# Patient Record
Sex: FEMALE | Race: WHITE | NOT HISPANIC OR LATINO | Employment: UNEMPLOYED | URBAN - NONMETROPOLITAN AREA
[De-identification: names, ages, dates, MRNs, and addresses within clinical notes are randomized per-mention and may not be internally consistent; named-entity substitution may affect disease eponyms.]

---

## 2024-10-17 ENCOUNTER — HOSPITAL ENCOUNTER (EMERGENCY)
Facility: HOSPITAL | Age: 54
Discharge: PSYCHIATRIC OR SUBSTANCE USE FACILITY | End: 2024-10-17
Attending: EMERGENCY MEDICINE
Payer: COMMERCIAL

## 2024-10-17 ENCOUNTER — HOSPITAL ENCOUNTER (OUTPATIENT)
Facility: HOSPITAL | Age: 54
Setting detail: OBSERVATION
Discharge: HOME | End: 2024-10-19
Attending: STUDENT IN AN ORGANIZED HEALTH CARE EDUCATION/TRAINING PROGRAM | Admitting: STUDENT IN AN ORGANIZED HEALTH CARE EDUCATION/TRAINING PROGRAM
Payer: COMMERCIAL

## 2024-10-17 VITALS
BODY MASS INDEX: 20.49 KG/M2 | HEIGHT: 64 IN | RESPIRATION RATE: 16 BRPM | OXYGEN SATURATION: 100 % | TEMPERATURE: 98.1 F | DIASTOLIC BLOOD PRESSURE: 80 MMHG | SYSTOLIC BLOOD PRESSURE: 128 MMHG | WEIGHT: 120 LBS | HEART RATE: 82 BPM

## 2024-10-17 DIAGNOSIS — F10.10 ALCOHOL ABUSE: Primary | ICD-10-CM

## 2024-10-17 PROBLEM — F19.20 CHEMICAL DEPENDENCY: Status: ACTIVE | Noted: 2024-10-17

## 2024-10-17 LAB
ALBUMIN SERPL-MCNC: 3.6 G/DL (ref 3.5–5.2)
ALBUMIN/GLOB SERPL: 1.4 G/DL
ALP SERPL-CCNC: 87 U/L (ref 39–117)
ALT SERPL W P-5'-P-CCNC: 11 U/L (ref 1–33)
AMPHET+METHAMPHET UR QL: NEGATIVE
AMPHETAMINES UR QL: POSITIVE
ANION GAP SERPL CALCULATED.3IONS-SCNC: 8.4 MMOL/L (ref 5–15)
AST SERPL-CCNC: 16 U/L (ref 1–32)
BARBITURATES UR QL SCN: NEGATIVE
BASOPHILS # BLD AUTO: 0.04 10*3/MM3 (ref 0–0.2)
BASOPHILS NFR BLD AUTO: 0.6 % (ref 0–1.5)
BENZODIAZ UR QL SCN: NEGATIVE
BILIRUB SERPL-MCNC: <0.2 MG/DL (ref 0–1.2)
BILIRUB UR QL STRIP: NEGATIVE
BUN SERPL-MCNC: 10 MG/DL (ref 6–20)
BUN/CREAT SERPL: 14.3 (ref 7–25)
BUPRENORPHINE SERPL-MCNC: NEGATIVE NG/ML
CALCIUM SPEC-SCNC: 9 MG/DL (ref 8.6–10.5)
CANNABINOIDS SERPL QL: NEGATIVE
CHLORIDE SERPL-SCNC: 105 MMOL/L (ref 98–107)
CLARITY UR: ABNORMAL
CO2 SERPL-SCNC: 28.6 MMOL/L (ref 22–29)
COCAINE UR QL: NEGATIVE
COLOR UR: YELLOW
CREAT SERPL-MCNC: 0.7 MG/DL (ref 0.57–1)
DEPRECATED RDW RBC AUTO: 47.4 FL (ref 37–54)
EGFRCR SERPLBLD CKD-EPI 2021: 103.6 ML/MIN/1.73
EOSINOPHIL # BLD AUTO: 0.16 10*3/MM3 (ref 0–0.4)
EOSINOPHIL NFR BLD AUTO: 2.3 % (ref 0.3–6.2)
ERYTHROCYTE [DISTWIDTH] IN BLOOD BY AUTOMATED COUNT: 13.2 % (ref 12.3–15.4)
ETHANOL BLD-MCNC: <10 MG/DL (ref 0–10)
ETHANOL UR QL: <0.01 %
FENTANYL UR-MCNC: NEGATIVE NG/ML
GLOBULIN UR ELPH-MCNC: 2.6 GM/DL
GLUCOSE SERPL-MCNC: 89 MG/DL (ref 65–99)
GLUCOSE UR STRIP-MCNC: NEGATIVE MG/DL
HCG SERPL QL: NEGATIVE
HCT VFR BLD AUTO: 43.1 % (ref 34–46.6)
HGB BLD-MCNC: 14.2 G/DL (ref 12–15.9)
HGB UR QL STRIP.AUTO: NEGATIVE
IMM GRANULOCYTES # BLD AUTO: 0.02 10*3/MM3 (ref 0–0.05)
IMM GRANULOCYTES NFR BLD AUTO: 0.3 % (ref 0–0.5)
KETONES UR QL STRIP: NEGATIVE
LEUKOCYTE ESTERASE UR QL STRIP.AUTO: NEGATIVE
LYMPHOCYTES # BLD AUTO: 2.22 10*3/MM3 (ref 0.7–3.1)
LYMPHOCYTES NFR BLD AUTO: 32.5 % (ref 19.6–45.3)
MAGNESIUM SERPL-MCNC: 2 MG/DL (ref 1.6–2.6)
MCH RBC QN AUTO: 31.7 PG (ref 26.6–33)
MCHC RBC AUTO-ENTMCNC: 32.9 G/DL (ref 31.5–35.7)
MCV RBC AUTO: 96.2 FL (ref 79–97)
METHADONE UR QL SCN: NEGATIVE
MONOCYTES # BLD AUTO: 0.62 10*3/MM3 (ref 0.1–0.9)
MONOCYTES NFR BLD AUTO: 9.1 % (ref 5–12)
NEUTROPHILS NFR BLD AUTO: 3.78 10*3/MM3 (ref 1.7–7)
NEUTROPHILS NFR BLD AUTO: 55.2 % (ref 42.7–76)
NITRITE UR QL STRIP: NEGATIVE
NRBC BLD AUTO-RTO: 0 /100 WBC (ref 0–0.2)
OPIATES UR QL: NEGATIVE
OXYCODONE UR QL SCN: NEGATIVE
PCP UR QL SCN: NEGATIVE
PH UR STRIP.AUTO: 6.5 [PH] (ref 5–8)
PLATELET # BLD AUTO: 161 10*3/MM3 (ref 140–450)
PMV BLD AUTO: 9.8 FL (ref 6–12)
POTASSIUM SERPL-SCNC: 4 MMOL/L (ref 3.5–5.2)
PROT SERPL-MCNC: 6.2 G/DL (ref 6–8.5)
PROT UR QL STRIP: NEGATIVE
RBC # BLD AUTO: 4.48 10*6/MM3 (ref 3.77–5.28)
SODIUM SERPL-SCNC: 142 MMOL/L (ref 136–145)
SP GR UR STRIP: 1.02 (ref 1–1.03)
TRICYCLICS UR QL SCN: NEGATIVE
UROBILINOGEN UR QL STRIP: ABNORMAL
WBC NRBC COR # BLD AUTO: 6.84 10*3/MM3 (ref 3.4–10.8)

## 2024-10-17 PROCEDURE — 82077 ASSAY SPEC XCP UR&BREATH IA: CPT

## 2024-10-17 PROCEDURE — G0378 HOSPITAL OBSERVATION PER HR: HCPCS

## 2024-10-17 PROCEDURE — 99285 EMERGENCY DEPT VISIT HI MDM: CPT

## 2024-10-17 PROCEDURE — 80053 COMPREHEN METABOLIC PANEL: CPT

## 2024-10-17 PROCEDURE — 80307 DRUG TEST PRSMV CHEM ANLYZR: CPT

## 2024-10-17 PROCEDURE — 36415 COLL VENOUS BLD VENIPUNCTURE: CPT

## 2024-10-17 PROCEDURE — 84703 CHORIONIC GONADOTROPIN ASSAY: CPT

## 2024-10-17 PROCEDURE — 93005 ELECTROCARDIOGRAM TRACING: CPT | Performed by: STUDENT IN AN ORGANIZED HEALTH CARE EDUCATION/TRAINING PROGRAM

## 2024-10-17 PROCEDURE — 83735 ASSAY OF MAGNESIUM: CPT

## 2024-10-17 PROCEDURE — 85025 COMPLETE CBC W/AUTO DIFF WBC: CPT

## 2024-10-17 PROCEDURE — 81003 URINALYSIS AUTO W/O SCOPE: CPT

## 2024-10-17 RX ORDER — BENZTROPINE MESYLATE 1 MG/ML
1 INJECTION, SOLUTION INTRAMUSCULAR; INTRAVENOUS ONCE AS NEEDED
Status: DISCONTINUED | OUTPATIENT
Start: 2024-10-17 | End: 2024-10-19 | Stop reason: HOSPADM

## 2024-10-17 RX ORDER — BENZTROPINE MESYLATE 1 MG/1
2 TABLET ORAL ONCE AS NEEDED
Status: DISCONTINUED | OUTPATIENT
Start: 2024-10-17 | End: 2024-10-19 | Stop reason: HOSPADM

## 2024-10-17 RX ORDER — POLYETHYLENE GLYCOL 3350 17 G/17G
17 POWDER, FOR SOLUTION ORAL DAILY PRN
Status: DISCONTINUED | OUTPATIENT
Start: 2024-10-17 | End: 2024-10-19 | Stop reason: HOSPADM

## 2024-10-17 RX ORDER — SERTRALINE HYDROCHLORIDE 100 MG/1
100 TABLET, FILM COATED ORAL DAILY
Status: ON HOLD | COMMUNITY
End: 2024-10-19

## 2024-10-17 RX ORDER — BENZONATATE 100 MG/1
100 CAPSULE ORAL 3 TIMES DAILY PRN
Status: DISCONTINUED | OUTPATIENT
Start: 2024-10-17 | End: 2024-10-19 | Stop reason: HOSPADM

## 2024-10-17 RX ORDER — FAMOTIDINE 20 MG/1
20 TABLET, FILM COATED ORAL 2 TIMES DAILY PRN
Status: DISCONTINUED | OUTPATIENT
Start: 2024-10-17 | End: 2024-10-19 | Stop reason: HOSPADM

## 2024-10-17 RX ORDER — HYDROXYZINE HYDROCHLORIDE 50 MG/1
50 TABLET, FILM COATED ORAL EVERY 6 HOURS PRN
Status: DISCONTINUED | OUTPATIENT
Start: 2024-10-17 | End: 2024-10-19 | Stop reason: HOSPADM

## 2024-10-17 RX ORDER — TRAZODONE HYDROCHLORIDE 50 MG/1
50 TABLET ORAL NIGHTLY PRN
Status: DISCONTINUED | OUTPATIENT
Start: 2024-10-17 | End: 2024-10-18

## 2024-10-17 RX ORDER — ONDANSETRON 4 MG/1
4 TABLET, ORALLY DISINTEGRATING ORAL EVERY 6 HOURS PRN
Status: DISCONTINUED | OUTPATIENT
Start: 2024-10-17 | End: 2024-10-19 | Stop reason: HOSPADM

## 2024-10-17 RX ORDER — MULTIPLE VITAMINS W/ MINERALS TAB 9MG-400MCG
1 TAB ORAL DAILY
Status: DISCONTINUED | OUTPATIENT
Start: 2024-10-18 | End: 2024-10-19 | Stop reason: HOSPADM

## 2024-10-17 RX ORDER — ALUMINA, MAGNESIA, AND SIMETHICONE 2400; 2400; 240 MG/30ML; MG/30ML; MG/30ML
15 SUSPENSION ORAL EVERY 6 HOURS PRN
Status: DISCONTINUED | OUTPATIENT
Start: 2024-10-17 | End: 2024-10-19 | Stop reason: HOSPADM

## 2024-10-17 RX ORDER — LOPERAMIDE HYDROCHLORIDE 2 MG/1
2 CAPSULE ORAL
Status: DISCONTINUED | OUTPATIENT
Start: 2024-10-17 | End: 2024-10-19 | Stop reason: HOSPADM

## 2024-10-17 RX ORDER — MIRTAZAPINE 15 MG/1
15 TABLET, FILM COATED ORAL NIGHTLY
Status: ON HOLD | COMMUNITY
End: 2024-10-19

## 2024-10-17 RX ORDER — ECHINACEA PURPUREA EXTRACT 125 MG
2 TABLET ORAL AS NEEDED
Status: DISCONTINUED | OUTPATIENT
Start: 2024-10-17 | End: 2024-10-19 | Stop reason: HOSPADM

## 2024-10-17 RX ORDER — IBUPROFEN 400 MG/1
400 TABLET, FILM COATED ORAL EVERY 6 HOURS PRN
Status: DISCONTINUED | OUTPATIENT
Start: 2024-10-17 | End: 2024-10-19 | Stop reason: HOSPADM

## 2024-10-17 RX ORDER — ACETAMINOPHEN 325 MG/1
650 TABLET ORAL EVERY 6 HOURS PRN
Status: DISCONTINUED | OUTPATIENT
Start: 2024-10-17 | End: 2024-10-19 | Stop reason: HOSPADM

## 2024-10-17 RX ORDER — MULTIVITAMIN WITH IRON
2 TABLET ORAL DAILY
Status: DISCONTINUED | OUTPATIENT
Start: 2024-10-18 | End: 2024-10-19 | Stop reason: HOSPADM

## 2024-10-18 PROBLEM — F17.200 NICOTINE USE DISORDER: Status: ACTIVE | Noted: 2024-10-18

## 2024-10-18 PROBLEM — F15.20 METHAMPHETAMINE USE DISORDER, SEVERE: Status: ACTIVE | Noted: 2024-10-18

## 2024-10-18 PROBLEM — F10.20 ALCOHOL USE DISORDER, SEVERE, DEPENDENCE: Status: ACTIVE | Noted: 2024-10-17

## 2024-10-18 PROBLEM — F33.8 OTHER RECURRENT DEPRESSIVE DISORDERS: Status: ACTIVE | Noted: 2024-10-18

## 2024-10-18 LAB
QT INTERVAL: 470 MS
QTC INTERVAL: 437 MS

## 2024-10-18 PROCEDURE — G0378 HOSPITAL OBSERVATION PER HR: HCPCS

## 2024-10-18 PROCEDURE — 93010 ELECTROCARDIOGRAM REPORT: CPT | Performed by: SPECIALIST

## 2024-10-18 PROCEDURE — 99222 1ST HOSP IP/OBS MODERATE 55: CPT | Performed by: PSYCHIATRY & NEUROLOGY

## 2024-10-18 RX ORDER — SERTRALINE HYDROCHLORIDE 25 MG/1
50 TABLET, FILM COATED ORAL DAILY
Status: DISCONTINUED | OUTPATIENT
Start: 2024-10-18 | End: 2024-10-19 | Stop reason: HOSPADM

## 2024-10-18 RX ORDER — MIRTAZAPINE 15 MG/1
15 TABLET, FILM COATED ORAL NIGHTLY
Status: DISCONTINUED | OUTPATIENT
Start: 2024-10-18 | End: 2024-10-19 | Stop reason: HOSPADM

## 2024-10-18 RX ADMIN — SERTRALINE HYDROCHLORIDE 50 MG: 25 TABLET ORAL at 10:54

## 2024-10-18 RX ADMIN — HYDROXYZINE HYDROCHLORIDE 50 MG: 50 TABLET, FILM COATED ORAL at 21:01

## 2024-10-18 RX ADMIN — MIRTAZAPINE 15 MG: 15 TABLET, FILM COATED ORAL at 21:01

## 2024-10-18 RX ADMIN — Medication 100 MG: at 08:49

## 2024-10-18 RX ADMIN — Medication 2 TABLET: at 08:49

## 2024-10-18 RX ADMIN — HYDROXYZINE HYDROCHLORIDE 50 MG: 50 TABLET, FILM COATED ORAL at 09:11

## 2024-10-18 RX ADMIN — Medication 1 TABLET: at 08:49

## 2024-10-18 NOTE — PLAN OF CARE
Goal Outcome Evaluation:        Consent Given to Review Plan with: Patient declines family involvement.     Outcome Evaluation: Therapist met with patient to review care plan, social history, aftercare recommendation, and disposition plan; patient agreeable.      Problem: Adult Behavioral Health Plan of Care  Goal: Plan of Care Review  Outcome: Progressing  Flowsheets  Taken 10/18/2024 1435 by Eulogio Ochoa  Consent Given to Review Plan with: Patient declines family involvement.  Outcome Evaluation:   Therapist met with patient to review care plan, social history, aftercare recommendation, and disposition plan   patient agreeable.  Taken 10/18/2024 0830 by Radha Patel, DANIEL  Progress: improving  Patient Agreement with Plan of Care: agrees  Plan of Care Reviewed With: patient     Problem: Adult Behavioral Health Plan of Care  Goal: Patient-Specific Goal (Individualization)  Outcome: Progressing  Flowsheets  Taken 10/18/2024 1435 by Eulogio Ochoa  Patient/Family-Specific Goals (Include Timeframe): Identify 2-3 coping skills, address relapse prevention methods, complete aftercare plan, complete safety plan, and deny SI/Hi within 1-7 days.  Individualized Care Needs: Therapist to offer 1-4 thrapy sessions, aftercare planning, safety planning, daily groups, and brief CBT/MI interventions.  Taken 10/18/2024 1421 by Eulogio Ochoa  Patient Personal Strengths:   self-reliant   resilient   resourceful  Patient Vulnerabilities:   substance abuse/addiction   housing insecurity  Taken 10/17/2024 2308 by Mildred Guzman, RN  Anxieties, Fears or Concerns: none voiced     Problem: Adult Behavioral Health Plan of Care  Goal: Optimized Coping Skills in Response to Life Stressors  Outcome: Progressing  Intervention: Promote Effective Coping Strategies  Flowsheets (Taken 10/18/2024 1435)  Supportive Measures:   active listening utilized   counseling provided   decision-making supported   positive reinforcement provided    problem-solving facilitated   self-responsibility promoted   verbalization of feelings encouraged   self-reflection promoted     Problem: Adult Behavioral Health Plan of Care  Goal: Develops/Participates in Therapeutic [place] to Support Successful Transition  Outcome: Progressing  Intervention: Foster Therapeutic [place]  Flowsheets (Taken 10/18/2024 1435)  Trust Relationship/Rapport:   care explained   choices provided   emotional support provided   reassurance provided   questions encouraged   empathic listening provided   thoughts/feelings acknowledged   questions answered  Intervention: Mutually Develop Transition Plan  Flowsheets  Taken 10/18/2024 1435 by Eulogio Ochoa  Discharge Coordination/Progress:   Therapist met with patient to complete discharge needs assessment   patient agreeable to residential substance use treatment.  Transition Support:   crisis management plan promoted   crisis management plan verbalized   follow-up care discussed   follow-up care coordinated  Anticipated Discharge Disposition: residential substance use unit  Current Discharge Risk:   substance use/abuse   homeless  Concerns to be Addressed:   substance/tobacco abuse/use   homelessness  Readmission Within the Last 30 Days: no previous admission in last 30 days  Taken 10/17/2024 2316 by Mildred Guzman, RN  Transportation Anticipated:   agency   family or friend will provide   public transportation  Transportation Concerns: no car  Patient/Family Anticipated Services at Transition:      mental health services   outpatient care   rehabilitation services  Patient/Family Anticipates Transition to: inpatient rehabilitation facility     DATA:  Therapist met individually with Patient this date for initial evaluation.  Introduced self as Therapist and the role of a positive therapeutic relationship; Patient agreeable.      Therapist encouraged Patient to speak openly and honestly about any issues or stressors during  treatment stay. Therapist explained how open communication is significant to providing most effective care.      Therapist completed psychosocial assessment, integrated summary, reviewed care plans, disposition planning and discussed hospitalization expectations and treatment goals this date.     Therapist provided education regarding different levels of care and is recommending residential substance use treatment for most appropriate aftercare. Patient agreeable. Patient signed consent for all residential substance abuse treatment facilities.     Therapist is recommending family involvement prior to discharge and it's importance. Patient declines.     CLINICAL MANUVERING/INTERVENTIONS:  Assisted Patient in processing session content; acknowledged and normalized Patient’s thoughts, feelings, and concerns by utilizing a person-centered approach in efforts to build appropriate rapport and a positive therapeutic relationship with open and honest communication. Allowed Patient to ventilate regarding current stressors and triggers for negative emotions and thoughts in a safe nonjudgmental environment with unconditional positive regard, active listening skills, and empathy.    ASSESSMENT: Patient is a 54 year old female who presented to the unit for alcohol detox. Patient reports that she is feeling rough and does not feel like talking much. Patient states that she is tired and feeling the effects of detox. Patient reports a desire to find a residential substance use facility here in [place] despite having [place] insurance; patient educated on the complicated nature of finding placement with this situation. Patient declines any interest in a facility in [place] and signed for all residential substance use facilities in [place]. Patient denies SI/HI/AVH.     Patient phone screening promoted with [facility], patient spoke with them on the phone and asked for them to call her back tomorrow.     PLAN: Patient will  receive 24/7 nursing monitoring and daily psychiatrist evaluation by a multidisciplinary team.    Patient will continue stabilization at this time.     Patient is agreeable for residential substance use treatment.

## 2024-10-18 NOTE — H&P
INITIAL PSYCHIATRIC HISTORY & PHYSICAL    Patient Identification:  Name:  Mary Grace Pavon  Age:  54 y.o.  Sex:  female  :  1970  MRN:  [id number]   Visit Number:  [id number]  Primary Care Physician:  Provider, No Known    SUBJECTIVE    CC/Focus of Exam: Alcohol and meth use    HPI: Mary Grace Pavon is a 54 y.o. female who was admitted under observation status on 10/17/2024 with complaints of alcohol and meth use and withdrawals. The patient reports a long history of substance use. First use was at age 18 when he used alcohol. He added meth about two years ago. Over time the use increased and the patient  continued to use despite negative consequences including relationship problems, social and financial problems. The patient endorses symptoms of tolerance and withdrawals and ongoing cravings to use. Has tried to cut down and stop but has not been successful. Spends too much time and resources in pursuit of substance use. Longest period of sobriety is reported to be a year.  Currently using 6 pack plus daily of beer and some liquor, 2-4 lines of meth every other day intranasally.   Last use of alcohol was two days ago, and meth was four days ago.   Withdrawal symptoms include stomach cramps, cold sweats, nauseated.     PAST PSYCHIATRIC HX: Patient reports a history of MDD and anxiety and is prescribed sertraline and mirtazapine. He states he has not taken his medications since July this year.      SUBSTANCE USE HX: See HPI.     SOCIAL HX:   Social History     Socioeconomic History    Marital status: Single   Tobacco Use    Smoking status: Every Day     Current packs/day: 1.00     Average packs/day: 1 pack/day for 20.8 years (20.8 ttl pk-yrs)     Types: Cigarettes     Start date:     Smokeless tobacco: Never   Vaping Use    Vaping status: Never Used   Substance and Sexual Activity    Alcohol use: Yes     Comment: 6 or more beers daily for several years    Drug use: Yes     Types: Methamphetamines      "Comment: \"two lines of meth whenever I can get it, normally everyday\"    Sexual activity: Not Currently     Partners: Female         Past Medical History:   Diagnosis Date    Alcohol abuse     Anxiety     Depression     Stroke         History reviewed. No pertinent surgical history.    History reviewed. No pertinent family history.      Medications Prior to Admission   Medication Sig Dispense Refill Last Dose/Taking    mirtazapine (REMERON) 15 MG tablet Take 1 tablet by mouth Every Night.   More than a month    sertraline (ZOLOFT) 100 MG tablet Take 1 tablet by mouth Daily.   More than a month         ALLERGIES:  Penicillins    Temp:  [97.5 °F (36.4 °C)-98.5 °F (36.9 °C)] 97.5 °F (36.4 °C)  Heart Rate:  [61-82] 80  Resp:  [16-18] 18  BP: (101-129)/(57-80) 112/76    REVIEW OF SYSTEMS:  Review of Systems   Constitutional:  Positive for diaphoresis and fatigue.   Respiratory: Negative.     Cardiovascular: Negative.    Gastrointestinal: Negative.    Psychiatric/Behavioral:  Positive for dysphoric mood. The patient is nervous/anxious.         OBJECTIVE    PHYSICAL EXAM:  Physical Exam  Constitutional:  Appears well-developed and well-nourished.   HENT:   Head: Normocephalic and atraumatic.   Right Ear: External ear normal.   Left Ear: External ear normal.   Mouth/Throat: Oropharynx is clear and moist.   Eyes: Pupils are equal, round, and reactive to light. Conjunctivae and EOM are normal.   Neck: Normal range of motion. Neck supple.   Cardiovascular: Normal rate, regular rhythm and normal heart sounds.    Respiratory: Effort normal and breath sounds normal. No respiratory distress. No wheezes.   GI: Soft. Bowel sounds are normal.No distension. There is no tenderness.   Musculoskeletal: Normal range of motion. No edema or deformity.   Neurological:  Cranial Nerves: I. No anosmia. II: No visual disturbance. III, IV VI: EOMI, PERRLA. V: Corneal reflext intact, no abnormal sensations. VII: No facial palsy, or altered " sensation. VIII: Hearing intact, balance intact. IX: Intact ah reflex. X: Normal phonation, swallowing. XI: Normal shrug and head movement. XII: Intact tongue movements  Coordination normal. No lateralizing signs.  Skin: Skin is warm and dry. No rash noted. No erythema.     MENTAL STATUS EXAM:   Hygiene:   fair  Cooperation:  Cooperative  Eye Contact:  Fair  Psychomotor Behavior:  Appropriate  Affect:  Appropriate  Hopelessness: Denies  Speech:  Normal  Thought Process: Goal directed  Thought Content:  Normal  Suicidal:  None  Homicidal:  None  Hallucinations:  None  Delusion:  None  Memory:  Intact  Orientation:  Person, Place, Time and Situation  Reliability:  fair  Insight:  Fair  Judgement:  Fair  Impulse Control:  Fair      Imaging Results (Last 24 Hours)       ** No results found for the last 24 hours. **             ECG/EMG Results (most recent)       Procedure Component Value Units Date/Time    ECG 12 Lead Other; Admission [[id number]] Collected: 10/18/24 0423     Updated: 10/18/24 0443     QT Interval 470 ms      QTC Interval 437 ms     Narrative:      Test Reason : Other~  Blood Pressure :   */*   mmHG  Vent. Rate :  52 BPM     Atrial Rate :  52 BPM     P-R Int : 166 ms          QRS Dur :  86 ms      QT Int : 470 ms       P-R-T Axes :  73  67  89 degrees     QTc Int : 437 ms    Sinus bradycardia  Cannot rule out Anterior infarct , age undetermined  Abnormal ECG  When compared with ECG of 18-OCT-2024 04:23, (Unconfirmed)  Previous ECG has undetermined rhythm, needs review    Referred By:            Confirmed By:              Lab Results   Component Value Date    GLUCOSE 89 10/17/2024    BUN 10 10/17/2024    CREATININE 0.70 10/17/2024    BCR 14.3 10/17/2024    CO2 28.6 10/17/2024    CALCIUM 9.0 10/17/2024    ALBUMIN 3.6 10/17/2024    AST 16 10/17/2024    ALT 11 10/17/2024       Lab Results   Component Value Date    WBC 6.84 10/17/2024    HGB 14.2 10/17/2024    HCT 43.1 10/17/2024    MCV 96.2 10/17/2024    PLT  161 10/17/2024       Last Urine Toxicity          Latest Ref Rng & Units 10/17/2024   LAST URINE TOXICITY RESULTS   Amphetamine, Urine Qual Negative Negative    Barbiturates Screen, Urine Negative Negative    Benzodiazepine Screen, Urine Negative Negative    Buprenorphine, Screen, Urine Negative Negative    Cocaine Screen, Urine Negative Negative    Fentanyl, Urine Negative Negative    Methadone Screen , Urine Negative Negative    Methamphetamine, Ur Negative Positive       Details                   Brief Urine Lab Results  (Last result in the past 365 days)        Color   Clarity   Blood   Leuk Est   Nitrite   Protein   CREAT   Urine HCG        10/17/24 2105 Yellow   Turbid   Negative   Negative   Negative   Negative                       ASSESSMENT & PLAN:    Hospital bed: No      Alcohol use disorder, severe, dependence  -Monitor for withdrawals and treat as indicated      Methamphetamine use disorder, severe  -Supportive treatment      Other recurrent depressive disorders  -Start sertraline 50 mg daily  -Start mirtazapine 15 mg hs      Nicotine use disorder  -Encourage cessation      The patient has been admitted under observation status for safety and stabilization.  Patient will be monitored for withdrawals and maintained on Special Precautions Level 4 (q30 min checks).  The patient will have individual and group therapy with a master's level therapist. A master treatment plan will be developed and agreed upon by the patient and his/her treatment team.  The patient's estimated length of stay in the hospital is 3-5 days.

## 2024-10-18 NOTE — NURSING NOTE
Spoke to Dr. Mckenna via phone. Intake information, labs, and vital sign provided. Instructed to admit sp4 with routine orders on Observation status. RBVOx2

## 2024-10-18 NOTE — NURSING NOTE
"Intake assessment completed at this time. Pt presents to Intake stating \"I want to detox from alcohol and I also use meth. My last drink was yesterday and I normally drink a 6 pack of beer or more a day for several years now\" Pt says she normally does a couple lines of meth most days. Pt reports a hx of rehab admission and 1 detox admission but not here. Pt reports being homeless for the last few months having issues with family. Pt states \"My dad filed an order of protection against me and I have court date next Thursday but I don't care.\"      Labs: UDS+ for Meth    Anxiety9  [id number]  craving0 on scale of 0-10. Sleep and appetite good.    Pt denies any SI/HI/AVH     CIWA- 9, pt reports nausea and light disturbances, mild sweat is observed.     Pt A&Ox4  "

## 2024-10-18 NOTE — PROGRESS NOTES
Navigator is helping with the following referral:    [facility] - [phone]  -Can not accept patient with [place] Medicaid.  10/18    [facility] - [phone]  -Jennie states they can not accept anyone until Monday. Patient to call Monday morning and complete phone screening for possible placement.  10/18    [facility] - [phone]  -Transferred call so patient could complete phone screening. 10/18  -Spoke with Dana. She stated patient asked them to call back tomorrow to complete phone screening.  Dana states a staff member will try to connect with patient tomorrow to continue referral process.  10/18

## 2024-10-18 NOTE — PLAN OF CARE
Goal Outcome Evaluation:  Plan of Care Reviewed With: patient  Plan of Care Reviewed With: patient  Patient Agreement with Plan of Care: agrees     Progress: improving  Outcome Evaluation: Pt reports she just wants to sleep this morning. Reports improved appetite this morning. Reports depression 8/10, anxiety 8/10, craving 0/10. Denies SI/HI/AVH.    Pt has appeared sleeping most of the day. Pt received Hydroxyzine for symptom mgmt with improvement.

## 2024-10-18 NOTE — ED PROVIDER NOTES
"Subjective   History of Present Illness  Patient is a 54-year-old female who presents wanting to detox.  Patient reports that she wants to detox off of both alcohol and methamphetamines.  Patient reports her last drink was yesterday and the last use of methamphetamines was 2 days ago.  Patient denies any suicidal ideation, homicidal ideation, or any auditory/visual hallucinations.  Patient has no other complaints this date.  Patient presents private vehicle.        Review of Systems   Constitutional: Negative.  Negative for fever.   HENT: Negative.     Respiratory: Negative.     Cardiovascular: Negative.  Negative for chest pain.   Gastrointestinal: Negative.  Negative for abdominal pain.   Endocrine: Negative.    Genitourinary: Negative.  Negative for dysuria.   Skin: Negative.    Neurological: Negative.    Psychiatric/Behavioral: Negative.     All other systems reviewed and are negative.      Past Medical History:   Diagnosis Date    Alcohol abuse     Anxiety     Depression     Stroke        Allergies   Allergen Reactions    Penicillins Anaphylaxis     Unknown reaction       History reviewed. No pertinent surgical history.    History reviewed. No pertinent family history.    Social History     Socioeconomic History    Marital status: Single   Tobacco Use    Smoking status: Every Day     Current packs/day: 1.00     Average packs/day: 1 pack/day for 20.8 years (20.8 ttl pk-yrs)     Types: Cigarettes     Start date: 2004    Smokeless tobacco: Never   Vaping Use    Vaping status: Never Used   Substance and Sexual Activity    Alcohol use: Yes     Comment: 6 or more beers daily for several years    Drug use: Yes     Types: Methamphetamines     Comment: \"two lines of meth whenever I can get it, normally everyday\"    Sexual activity: Not Currently     Partners: Female           Objective   Physical Exam  Vitals and nursing note reviewed.   Constitutional:       General: She is not in acute distress.     Appearance: She is " well-developed. She is not diaphoretic.   HENT:      Head: Normocephalic and atraumatic.      Right Ear: External ear normal.      Left Ear: External ear normal.      Nose: Nose normal.   Eyes:      Conjunctiva/sclera: Conjunctivae normal.      Pupils: Pupils are equal, round, and reactive to light.   Neck:      Vascular: No JVD.      Trachea: No tracheal deviation.   Cardiovascular:      Rate and Rhythm: Normal rate and regular rhythm.      Heart sounds: Normal heart sounds. No murmur heard.  Pulmonary:      Effort: Pulmonary effort is normal. No respiratory distress.      Breath sounds: Normal breath sounds. No wheezing.   Abdominal:      General: Bowel sounds are normal.      Palpations: Abdomen is soft.      Tenderness: There is no abdominal tenderness.   Musculoskeletal:         General: No deformity. Normal range of motion.      Cervical back: Normal range of motion and neck supple.   Skin:     General: Skin is warm and dry.      Coloration: Skin is not pale.      Findings: No erythema or rash.   Neurological:      Mental Status: She is alert and oriented to person, place, and time.      Cranial Nerves: No cranial nerve deficit.   Psychiatric:         Behavior: Behavior normal.         Thought Content: Thought content normal.       Results for orders placed or performed during the hospital encounter of 10/17/24   Urinalysis With Microscopic If Indicated (No Culture) - Urine, Clean Catch    Collection Time: 10/17/24  9:05 PM    Specimen: Urine, Clean Catch   Result Value Ref Range    Color, UA Yellow Yellow, Straw    Appearance, UA Turbid (A) Clear    pH, UA 6.5 5.0 - 8.0    Specific Gravity, UA 1.022 1.005 - 1.030    Glucose, UA Negative Negative    Ketones, UA Negative Negative    Bilirubin, UA Negative Negative    Blood, UA Negative Negative    Protein, UA Negative Negative    Leuk Esterase, UA Negative Negative    Nitrite, UA Negative Negative    Urobilinogen, UA 0.2 E.U./dL 0.2 - 1.0 E.U./dL   Urine Drug  Screen - Urine, Clean Catch    Collection Time: 10/17/24  9:05 PM    Specimen: Urine, Clean Catch   Result Value Ref Range    THC, Screen, Urine Negative Negative    Phencyclidine (PCP), Urine Negative Negative    Cocaine Screen, Urine Negative Negative    Methamphetamine, Ur Positive (A) Negative    Opiate Screen Negative Negative    Amphetamine Screen, Urine Negative Negative    Benzodiazepine Screen, Urine Negative Negative    Tricyclic Antidepressants Screen Negative Negative    Methadone Screen, Urine Negative Negative    Barbiturates Screen, Urine Negative Negative    Oxycodone Screen, Urine Negative Negative    Buprenorphine, Screen, Urine Negative Negative   Fentanyl, Urine - Urine, Clean Catch    Collection Time: 10/17/24  9:05 PM    Specimen: Urine, Clean Catch   Result Value Ref Range    Fentanyl, Urine Negative Negative   hCG, Serum, Qualitative    Collection Time: 10/17/24  9:40 PM    Specimen: Blood   Result Value Ref Range    HCG Qualitative Negative Negative   Comprehensive Metabolic Panel    Collection Time: 10/17/24  9:40 PM    Specimen: Blood   Result Value Ref Range    Glucose 89 65 - 99 mg/dL    BUN 10 6 - 20 mg/dL    Creatinine 0.70 0.57 - 1.00 mg/dL    Sodium 142 136 - 145 mmol/L    Potassium 4.0 3.5 - 5.2 mmol/L    Chloride 105 98 - 107 mmol/L    CO2 28.6 22.0 - 29.0 mmol/L    Calcium 9.0 8.6 - 10.5 mg/dL    Total Protein 6.2 6.0 - 8.5 g/dL    Albumin 3.6 3.5 - 5.2 g/dL    ALT (SGPT) 11 1 - 33 U/L    AST (SGOT) 16 1 - 32 U/L    Alkaline Phosphatase 87 39 - 117 U/L    Total Bilirubin <0.2 0.0 - 1.2 mg/dL    Globulin 2.6 gm/dL    A/G Ratio 1.4 g/dL    BUN/Creatinine Ratio 14.3 7.0 - 25.0    Anion Gap 8.4 5.0 - 15.0 mmol/L    eGFR 103.6 >60.0 mL/min/1.73   Magnesium    Collection Time: 10/17/24  9:40 PM    Specimen: Blood   Result Value Ref Range    Magnesium 2.0 1.6 - 2.6 mg/dL   Ethanol    Collection Time: 10/17/24  9:40 PM    Specimen: Blood   Result Value Ref Range    Ethanol <10 0 - 10  mg/dL    Ethanol % <0.010 %   CBC Auto Differential    Collection Time: 10/17/24  9:40 PM    Specimen: Blood   Result Value Ref Range    WBC 6.84 3.40 - 10.80 10*3/mm3    RBC 4.48 3.77 - 5.28 10*6/mm3    Hemoglobin 14.2 12.0 - 15.9 g/dL    Hematocrit 43.1 34.0 - 46.6 %    MCV 96.2 79.0 - 97.0 fL    MCH 31.7 26.6 - 33.0 pg    MCHC 32.9 31.5 - 35.7 g/dL    RDW 13.2 12.3 - 15.4 %    RDW-SD 47.4 37.0 - 54.0 fl    MPV 9.8 6.0 - 12.0 fL    Platelets 161 140 - 450 10*3/mm3    Neutrophil % 55.2 42.7 - 76.0 %    Lymphocyte % 32.5 19.6 - 45.3 %    Monocyte % 9.1 5.0 - 12.0 %    Eosinophil % 2.3 0.3 - 6.2 %    Basophil % 0.6 0.0 - 1.5 %    Immature Grans % 0.3 0.0 - 0.5 %    Neutrophils, Absolute 3.78 1.70 - 7.00 10*3/mm3    Lymphocytes, Absolute 2.22 0.70 - 3.10 10*3/mm3    Monocytes, Absolute 0.62 0.10 - 0.90 10*3/mm3    Eosinophils, Absolute 0.16 0.00 - 0.40 10*3/mm3    Basophils, Absolute 0.04 0.00 - 0.20 10*3/mm3    Immature Grans, Absolute 0.02 0.00 - 0.05 10*3/mm3    nRBC 0.0 0.0 - 0.2 /100 WBC           Procedures           ED Course                                             Medical Decision Making  Patient is a 54-year-old female who presents wanting to detox.  Patient reports that she wants to detox off of both alcohol and methamphetamines.  Patient reports her last drink was yesterday and the last use of methamphetamines was 2 days ago.  Patient denies any suicidal ideation, homicidal ideation, or any auditory/visual hallucinations.  Patient has no other complaints this date.  Patient presents private vehicle.        Final diagnoses:   Alcohol abuse       ED Disposition  ED Disposition       ED Disposition   DC/Transfer to Behavioral Health Condition   --    Comment   --               No follow-up provider specified.       Medication List      No changes were made to your prescriptions during this visit.            Belinda Davis, APRN  10/18/24 0120

## 2024-10-18 NOTE — PLAN OF CARE
"Goal Outcome Evaluation:  Plan of Care Reviewed With: patient  Plan of Care Reviewed With: patient  Patient Agreement with Plan of Care: agrees     Progress: no change  Outcome Evaluation: Patient came to the ED to detox off of alcohol. Patient reports drinking a 6 pack with 10% alcohol a piece. Patient also reports using couple lines on Meth daily. Pt reports a hx of rehab admission and 1 detox admission, but not here. Patient reports being homeless and having a lot of stuff going on. Patient states, \"everything is falling apart.\"  Patient rates anxiety 7/10. Depression 10/10. Denies SI/HI/AVH. Appetite-good. Sleep-poor. CIWA-7  Labs: UDS+ for Meth                             "

## 2024-10-19 VITALS
SYSTOLIC BLOOD PRESSURE: 123 MMHG | TEMPERATURE: 97.5 F | WEIGHT: 114.6 LBS | RESPIRATION RATE: 18 BRPM | DIASTOLIC BLOOD PRESSURE: 82 MMHG | HEART RATE: 69 BPM | HEIGHT: 64 IN | OXYGEN SATURATION: 98 % | BODY MASS INDEX: 19.56 KG/M2

## 2024-10-19 PROCEDURE — G0378 HOSPITAL OBSERVATION PER HR: HCPCS

## 2024-10-19 PROCEDURE — 99239 HOSP IP/OBS DSCHRG MGMT >30: CPT | Performed by: PSYCHIATRY & NEUROLOGY

## 2024-10-19 RX ORDER — MIRTAZAPINE 15 MG/1
15 TABLET, FILM COATED ORAL NIGHTLY
Qty: 30 TABLET | Refills: 0 | Status: SHIPPED | OUTPATIENT
Start: 2024-10-19

## 2024-10-19 RX ORDER — SERTRALINE HYDROCHLORIDE 100 MG/1
100 TABLET, FILM COATED ORAL DAILY
Qty: 30 TABLET | Refills: 0 | Status: SHIPPED | OUTPATIENT
Start: 2024-10-19

## 2024-10-19 RX ORDER — HYDROXYZINE HYDROCHLORIDE 25 MG/1
25 TABLET, FILM COATED ORAL EVERY 8 HOURS PRN
Qty: 30 TABLET | Refills: 0 | Status: SHIPPED | OUTPATIENT
Start: 2024-10-19

## 2024-10-19 RX ADMIN — SERTRALINE HYDROCHLORIDE 50 MG: 25 TABLET ORAL at 08:28

## 2024-10-19 RX ADMIN — IBUPROFEN 400 MG: 400 TABLET, FILM COATED ORAL at 08:28

## 2024-10-19 RX ADMIN — Medication 2 TABLET: at 08:28

## 2024-10-19 RX ADMIN — Medication 1 TABLET: at 08:28

## 2024-10-19 RX ADMIN — Medication 100 MG: at 08:28

## 2024-10-19 RX ADMIN — HYDROXYZINE HYDROCHLORIDE 50 MG: 50 TABLET, FILM COATED ORAL at 08:28

## 2024-10-19 NOTE — DISCHARGE SUMMARY
PSYCHIATRIC DISCHARGE SUMMARY     Patient Identification:  Name:  Mary Grace Pavon  Age:  54 y.o.  Sex:  female  :  1970  MRN:  [id number]  Visit Number:  [id number]    Date of Admission:10/17/2024   Date of Discharge:  10/19/2024    Discharge Diagnosis:  Principal Problem:    Alcohol use disorder, severe, dependence  Active Problems:    Methamphetamine use disorder, severe    Other recurrent depressive disorders    Nicotine use disorder      Admission Diagnosis:  Chemical dependency [F19.20]     Hospital Course  Patient is a 54 y.o. female presented with alcohol and methamphetamine abuse.  Admitted for observation for medically assisted detox.  No acutely concerning labs on admission.  Ethanol 0, UDS + meth.  Patient remained hemodynamically stable through the entirety of her stay, exhibiting no withdrawal symptoms that would necessitate transition to inpatient detox.  Patient did repeatedly refused treatment recommendations, such as group therapy and completion of phone assessment with available rehab facilities.  It was explained to the patient that she did not meet criteria for further hospitalization today.  Patient irritable and demanding, continuing to refuse rehab recommendation, casting some doubt on patient's intention for ongoing treatment as there was a facility open to accepting her today.  Nevertheless, patient does not require further hospitalization and is appropriate for discharge at this time.    By the conclusion of this hospitalization, patient is exhibiting no acutely concerning symptoms of mood, psychotic or thought disorder that would necessitate further inpatient care. Patient is also denying SI, HI, and AVH. Patient has shown improvement of presenting symptoms, exhibited no behavior concerning for harm to self or others, and is considered appropriate for discharge to a lower level of care today. Treatment and safe discharge planning completed. Outpatient care ascertained.  "    Mental Status Exam upon discharge:   Mood \"fine\"   Affect-congruent, appropriate, stable  Thought Content-goal directed, no delusional material present  Thought process-linear, organized.  Suicidality: No SI  Homicidality: No HI  Perception: No AH/VH    Procedures Performed         Consults:   Consults       No orders found from 9/18/2024 to 10/18/2024.            Pertinent Test Results:   Lab Results (last 7 days)       ** No results found for the last 168 hours. **            Condition on Discharge:  improved    Vital Signs  Temp:  [97.2 °F (36.2 °C)-98.5 °F (36.9 °C)] 97.5 °F (36.4 °C)  Heart Rate:  [64-97] 69  Resp:  [16-18] 18  BP: (101-135)/(64-82) 123/82    Discharge Disposition:  Home or Self Care    Discharge Medications:     Discharge Medications        New Medications        Instructions Start Date   hydrOXYzine 25 MG tablet  Commonly known as: ATARAX   25 mg, Oral, Every 8 Hours PRN             Continue These Medications        Instructions Start Date   mirtazapine 15 MG tablet  Commonly known as: REMERON   15 mg, Oral, Nightly      sertraline 100 MG tablet  Commonly known as: ZOLOFT   100 mg, Oral, Daily               Discharge Diet: Normal    Activity at Discharge: Normal    Follow-up Appointments  No future appointments.      Test Results Pending at Discharge  None     Time: I spent greater than 30 minutes on this discharge activity which included: face-to-face encounter with the patient, reviewing the data in the system, coordination of the care with the nursing staff as well as consultants, documentation, and entering orders.      Clinician:   Mario Gutiérrez MD  10/19/24  12:53 EDT  "

## 2024-10-19 NOTE — PLAN OF CARE
Goal Outcome Evaluation:  Plan of Care Reviewed With: patient  Patient Agreement with Plan of Care: agrees

## 2024-10-19 NOTE — THERAPY DISCHARGE NOTE
Patient Name:  Mary Grace Pavon  YOB: 1970  MRN: [id number]  Admit Date:  10/17/2024    Therapist staffed case with Dr. Gutiérrez.  Patient is being discharged on this date.    Therapist spoke with patient who wanted to return to [facility] however they were not agreeable to pick her up on this date.    Patient completed a phone screening with [facility] and will be discharged there today.  Staff to pick patient up around 3 PM.    Electronically signed by:  NICK Wooten  10/19/24 13:32 EDT

## 2024-10-19 NOTE — PLAN OF CARE
Goal Outcome Evaluation:  Plan of Care Reviewed With: patient  Plan of Care Reviewed With: patient  Patient Agreement with Plan of Care: agrees     Progress: improving     Patient rates anxiety 10/10, depression 10/10, and cravings 5/10. Appetite is poor for shift and refused group. Pt given Atarax prn medication.